# Patient Record
Sex: FEMALE | Race: WHITE | Employment: FULL TIME | URBAN - METROPOLITAN AREA
[De-identification: names, ages, dates, MRNs, and addresses within clinical notes are randomized per-mention and may not be internally consistent; named-entity substitution may affect disease eponyms.]

---

## 2021-09-10 NOTE — PROGRESS NOTES
SUBJECTIVE:   CC: Helena Pelaez is an 26 year old woman who presents for preventive health visit.       Patient has been advised of split billing requirements and indicates understanding:   Healthy Habits:     Getting at least 3 servings of Calcium per day:  NO    Bi-annual eye exam:  NO    Dental care twice a year:  Yes    Sleep apnea or symptoms of sleep apnea:  None    Diet:  Vegetarian/vegan    Frequency of exercise:  None    Taking medications regularly:  No    Medication side effects:  None    PHQ-2 Total Score: 0    Additional concerns today:  Yes          Discuss family history recently maternal aunt diagnosed with ovarian cancer.    Today's PHQ-2 Score:   PHQ-2 ( 1999 [employer]) 9/14/2021   Q1: Little interest or pleasure in doing things 0   Q2: Feeling down, depressed or hopeless 0   PHQ-2 Score 0   Q1: Little interest or pleasure in doing things Not at all   Q2: Feeling down, depressed or hopeless Not at all   PHQ-2 Score 0       Abuse: Current or Past (Physical, Sexual or Emotional) - No  Do you feel safe in your environment? Yes        Social History     Tobacco Use     Smoking status: Never Smoker     Smokeless tobacco: Never Used   Substance Use Topics     Alcohol use: Never     If you drink alcohol do you typically have >3 drinks per day or >7 drinks per week? No    Alcohol Use 9/14/2021   Prescreen: >3 drinks/day or >7 drinks/week? No   Prescreen: >3 drinks/day or >7 drinks/week? -   No flowsheet data found.    Reviewed orders with patient.  Reviewed health maintenance and updated orders accordingly - Yes  Patient Active Problem List   Diagnosis     Lumbago     Generalized anxiety disorder     Iron deficiency anemia due to chronic blood loss     History reviewed. No pertinent surgical history.    Social History     Tobacco Use     Smoking status: Never Smoker     Smokeless tobacco: Never Used   Substance Use Topics     Alcohol use: Never     Family History   Problem Relation Age of Onset      Ovarian cysts Mother      Multiple Sclerosis Mother 55     Depression Mother      Anxiety Disorder Mother      Coronary Artery Disease Maternal Grandmother      Diabetes Type 2  Paternal Grandmother      Hypertension Paternal Grandmother      Ovarian Cancer Maternal Aunt 65           Breast Cancer Screening:  Any new diagnosis of family breast, ovarian, or bowel cancer?     FHS-7:   Breast CA Risk Assessment (FHS-7) 9/14/2021   Did any of your first-degree relatives have breast or ovarian cancer? Yes   Did any of your relatives have bilateral breast cancer? No   Did any man in your family have breast cancer? No   Did any woman in your family have breast and ovarian cancer? Yes   Did any woman in your family have breast cancer before age 50 y? No   Do you have 2 or more relatives with breast and/or ovarian cancer? Yes   Do you have 2 or more relatives with breast and/or bowel cancer? No       Patient under 40 years of age: Routine Mammogram Screening not recommended.   Pertinent mammograms are reviewed under the imaging tab.    History of abnormal Pap smear: NO - age 21-29 PAP every 3 years recommended     Reviewed and updated as needed this visit by clinical staff  Tobacco  Allergies  Meds  Problems  Med Hx  Surg Hx  Fam Hx  Soc Hx          Reviewed and updated as needed this visit by Provider     Problems    Fam Hx             Review of Systems   Constitutional: Negative for chills and fever.   HENT: Positive for ear pain. Negative for congestion, hearing loss and sore throat.    Eyes: Negative for pain and visual disturbance.   Respiratory: Positive for cough. Negative for shortness of breath.    Cardiovascular: Negative for chest pain, palpitations and peripheral edema.   Gastrointestinal: Negative for abdominal pain, constipation, diarrhea, heartburn, hematochezia and nausea.   Breasts:  Negative for tenderness, breast mass and discharge.   Genitourinary: Negative for dysuria, frequency, genital sores,  "hematuria, pelvic pain, urgency, vaginal bleeding and vaginal discharge.   Musculoskeletal: Negative for arthralgias, joint swelling and myalgias.   Skin: Negative for rash.   Neurological: Positive for dizziness. Negative for weakness, headaches and paresthesias.   Psychiatric/Behavioral: Negative for mood changes. The patient is nervous/anxious.           OBJECTIVE:   /69   Pulse 71   Temp 97.3  F (36.3  C)   Resp 16   Ht 1.803 m (5' 11\")   Wt 69.2 kg (152 lb 8 oz)   LMP 08/14/2021   SpO2 100%   BMI 21.27 kg/m    Physical Exam  GENERAL: alert and no distress  EYES: Eyes grossly normal to inspection, PERRL and conjunctivae and sclerae normal  HENT: ear canals and TM's normal, nose and mouth without ulcers or lesions  NECK: no adenopathy, no asymmetry, masses, or scars and thyroid normal to palpation  RESP: lungs clear to auscultation - no rales, rhonchi or wheezes  BREAST: normal without masses, tenderness or nipple discharge and no palpable axillary masses or adenopathy  CV: regular rate and rhythm, normal S1 S2, no S3 or S4, no murmur, click or rub, no peripheral edema and peripheral pulses strong  ABDOMEN: soft, nontender, no hepatosplenomegaly, no masses and bowel sounds normal  MS: no gross musculoskeletal defects noted, no edema  SKIN: no suspicious lesions or rashes  NEURO: Normal strength and tone, mentation intact and speech normal  PSYCH: mentation appears normal, affect normal/bright    Diagnostic Test Results:  Labs reviewed in Epic  Results for orders placed or performed in visit on 09/14/21 (from the past 24 hour(s))   CBC with platelets   Result Value Ref Range    WBC Count 6.1 4.0 - 11.0 10e3/uL    RBC Count 4.21 3.80 - 5.20 10e6/uL    Hemoglobin 9.0 (L) 11.7 - 15.7 g/dL    Hematocrit 30.0 (L) 35.0 - 47.0 %    MCV 71 (L) 78 - 100 fL    MCH 21.4 (L) 26.5 - 33.0 pg    MCHC 30.0 (L) 31.5 - 36.5 g/dL    RDW 15.0 10.0 - 15.0 %    Platelet Count 238 150 - 450 10e3/uL   Hepatitis C Screen " "Reflex to HCV RNA Quant and Genotype   Result Value Ref Range    Hepatitis C Antibody Nonreactive Nonreactive    Narrative    Assay performance characteristics have not been established for newborns, infants, and children.       ASSESSMENT/PLAN:   (Z00.00) Routine general medical examination at a health care facility  (primary encounter diagnosis)  Comment: routine screening   Pap pending   Plan: Pap screen reflex to HPV if ASCUS - recommend         age 25 - 29, HIV Antigen Antibody Combo,         Hepatitis C Screen Reflex to HCV RNA Quant and         Genotype, Lipid panel reflex to direct LDL         Non-fasting             (F41.1) Generalized anxiety disorder  Comment:    Plan:  Worked with therapist - not on any medications     (D50.0) Iron deficiency anemia due to chronic blood loss  Comment: iron def anemia her entire life -   Not clear why?  She had hemoglobin as low as 3 in the remote past. No prior transfusions  Will check some labs and will refer to hematology for further evaluation and any recommendations ongoing.  Plan: CBC with platelets, Ferritin, Iron and iron         binding capacity, Tissue transglutaminase randy         IgA and IgG, Oncology/Hematology Adult Referral               Patient has been advised of split billing requirements and indicates understanding: Yes  COUNSELING:  Reviewed preventive health counseling, as reflected in patient instructions    Estimated body mass index is 21.27 kg/m  as calculated from the following:    Height as of this encounter: 1.803 m (5' 11\").    Weight as of this encounter: 69.2 kg (152 lb 8 oz).         She reports that she has never smoked. She has never used smokeless tobacco.      Counseling Resources:  ATP IV Guidelines  Pooled Cohorts Equation Calculator  Breast Cancer Risk Calculator  BRCA-Related Cancer Risk Assessment: FHS-7 Tool  FRAX Risk Assessment  ICSI Preventive Guidelines  Dietary Guidelines for Americans, 2010  USDA's MyPlate  ASA " Prophylaxis  Lung CA Screening    Ina Brady, [facility]

## 2021-09-14 ENCOUNTER — TELEPHONE (OUTPATIENT)
Dept: FAMILY MEDICINE | Facility: CLINIC | Age: 26
End: 2021-09-14

## 2021-09-14 ENCOUNTER — OFFICE VISIT (OUTPATIENT)
Dept: FAMILY MEDICINE | Facility: CLINIC | Age: 26
End: 2021-09-14
Payer: COMMERCIAL

## 2021-09-14 VITALS
RESPIRATION RATE: 16 BRPM | BODY MASS INDEX: 21.35 KG/M2 | HEART RATE: 71 BPM | OXYGEN SATURATION: 100 % | DIASTOLIC BLOOD PRESSURE: 69 MMHG | SYSTOLIC BLOOD PRESSURE: 123 MMHG | TEMPERATURE: 97.3 F | WEIGHT: 152.5 LBS | HEIGHT: 71 IN

## 2021-09-14 DIAGNOSIS — F41.1 GENERALIZED ANXIETY DISORDER: ICD-10-CM

## 2021-09-14 DIAGNOSIS — Z00.00 ROUTINE GENERAL MEDICAL EXAMINATION AT A HEALTH CARE FACILITY: Primary | ICD-10-CM

## 2021-09-14 DIAGNOSIS — D50.0 IRON DEFICIENCY ANEMIA DUE TO CHRONIC BLOOD LOSS: ICD-10-CM

## 2021-09-14 LAB
CHOLEST SERPL-MCNC: 145 MG/DL
ERYTHROCYTE [DISTWIDTH] IN BLOOD BY AUTOMATED COUNT: 15 % (ref 10–15)
FASTING STATUS PATIENT QL REPORTED: YES
FERRITIN SERPL-MCNC: 3 NG/ML (ref 12–150)
HCT VFR BLD AUTO: 30 % (ref 35–47)
HCV AB SERPL QL IA: NONREACTIVE
HDLC SERPL-MCNC: 59 MG/DL
HGB BLD-MCNC: 9 G/DL (ref 11.7–15.7)
IRON SATN MFR SERPL: 4 % (ref 15–46)
IRON SERPL-MCNC: 19 UG/DL (ref 35–180)
LDLC SERPL CALC-MCNC: 68 MG/DL
MCH RBC QN AUTO: 21.4 PG (ref 26.5–33)
MCHC RBC AUTO-ENTMCNC: 30 G/DL (ref 31.5–36.5)
MCV RBC AUTO: 71 FL (ref 78–100)
NONHDLC SERPL-MCNC: 86 MG/DL
PLATELET # BLD AUTO: 238 10E3/UL (ref 150–450)
RBC # BLD AUTO: 4.21 10E6/UL (ref 3.8–5.2)
TIBC SERPL-MCNC: 431 UG/DL (ref 240–430)
TRIGL SERPL-MCNC: 92 MG/DL
WBC # BLD AUTO: 6.1 10E3/UL (ref 4–11)

## 2021-09-14 PROCEDURE — 86803 HEPATITIS C AB TEST: CPT | Performed by: FAMILY MEDICINE

## 2021-09-14 PROCEDURE — 85027 COMPLETE CBC AUTOMATED: CPT | Performed by: FAMILY MEDICINE

## 2021-09-14 PROCEDURE — G0145 SCR C/V CYTO,THINLAYER,RESCR: HCPCS | Performed by: FAMILY MEDICINE

## 2021-09-14 PROCEDURE — 80061 LIPID PANEL: CPT | Performed by: FAMILY MEDICINE

## 2021-09-14 PROCEDURE — 82728 ASSAY OF FERRITIN: CPT | Performed by: FAMILY MEDICINE

## 2021-09-14 PROCEDURE — 90686 IIV4 VACC NO PRSV 0.5 ML IM: CPT | Performed by: FAMILY MEDICINE

## 2021-09-14 PROCEDURE — 99385 PREV VISIT NEW AGE 18-39: CPT | Mod: 25 | Performed by: FAMILY MEDICINE

## 2021-09-14 PROCEDURE — 83516 IMMUNOASSAY NONANTIBODY: CPT | Mod: 59 | Performed by: FAMILY MEDICINE

## 2021-09-14 PROCEDURE — 83550 IRON BINDING TEST: CPT | Performed by: FAMILY MEDICINE

## 2021-09-14 PROCEDURE — 87389 HIV-1 AG W/HIV-1&-2 AB AG IA: CPT | Performed by: FAMILY MEDICINE

## 2021-09-14 PROCEDURE — 90471 IMMUNIZATION ADMIN: CPT | Performed by: FAMILY MEDICINE

## 2021-09-14 PROCEDURE — 36415 COLL VENOUS BLD VENIPUNCTURE: CPT | Performed by: FAMILY MEDICINE

## 2021-09-14 ASSESSMENT — ENCOUNTER SYMPTOMS
NERVOUS/ANXIOUS: 1
JOINT SWELLING: 0
HEMATOCHEZIA: 0
FREQUENCY: 0
CONSTIPATION: 0
SHORTNESS OF BREATH: 0
SORE THROAT: 0
EYE PAIN: 0
PALPITATIONS: 0
DIARRHEA: 0
MYALGIAS: 0
WEAKNESS: 0
ARTHRALGIAS: 0
FEVER: 0
NAUSEA: 0
HEADACHES: 0
ABDOMINAL PAIN: 0
COUGH: 1
HEARTBURN: 0
HEMATURIA: 0
CHILLS: 0
PARESTHESIAS: 0
DYSURIA: 0
BREAST MASS: 0
DIZZINESS: 1

## 2021-09-14 ASSESSMENT — MIFFLIN-ST. JEOR: SCORE: 1522.87

## 2021-09-14 NOTE — RESULT ENCOUNTER NOTE
Please call to let her know:  Dear Helena,   Not all tests are back yet - but your hemoglobin is low (as always).  I placed and order for hematology and someone should call to set up a visit.  It would be good to find out why you have been anemic your entire life.  Let us know if you have any questions.  -Ina Brady, DO

## 2021-09-14 NOTE — TELEPHONE ENCOUNTER
Left message for patient to call [facility] back  When patient calls back please transfer to RN  Christine VASQUEZ, BETSY Brady, Ina PALMER,    9/14/2021  4:32 PM CDT Back to Top      Please call to let her know:  Dear Helena,   Not all tests are back yet - but your hemoglobin is low (as always).  I placed and order for hematology and someone should call to set up a visit.  It would be good to find out why you have been anemic your entire life.  Let us know if you have any questions.  -Ina Brady, DO

## 2021-09-14 NOTE — LETTER
September 16, 2021      Helena M Benigno  [address] [facility] [address]        Dear ,    We are writing to inform you of your test results.    Your hemoglobin is low like we discussed.  Iron store - ferritin and iron - are also low.   The TTG or test for celiac was negative at this time.   Other tests are all normal/negative.       Resulted Orders   CBC with platelets   Result Value Ref Range    WBC Count 6.1 4.0 - 11.0 10e3/uL    RBC Count 4.21 3.80 - 5.20 10e6/uL    Hemoglobin 9.0 (L) 11.7 - 15.7 g/dL    Hematocrit 30.0 (L) 35.0 - 47.0 %    MCV 71 (L) 78 - 100 fL    MCH 21.4 (L) 26.5 - 33.0 pg    MCHC 30.0 (L) 31.5 - 36.5 g/dL    RDW 15.0 10.0 - 15.0 %    Platelet Count 238 150 - 450 10e3/uL   Ferritin   Result Value Ref Range    Ferritin 3 (L) 12 - 150 ng/mL   Iron and iron binding capacity   Result Value Ref Range    Iron 19 (L) 35 - 180 ug/dL    Iron Binding Capacity 431 (H) 240 - 430 ug/dL    Iron Sat Index 4 (L) 15 - 46 %   Tissue transglutaminase randy IgA and IgG   Result Value Ref Range    Tissue Transglutaminase Antibody IgA 0.4 <7.0 U/mL      Comment:      Negative- The tTG-IgA assay has limited utility for patients with decreased levels of IgA. Screening for celiac disease should include IgA testing to rule out selective IgA deficiency and to guide selection and interpretation of serological testing. tTG-IgG testing may be positive in celiac disease patients with IgA deficiency.    Tissue Transglutaminase Antibody IgG 1.4 <7.0 U/mL      Comment:      Negative   HIV Antigen Antibody Combo   Result Value Ref Range    HIV Antigen Antibody Combo Nonreactive Nonreactive      Comment:      HIV-1 p24 Ag & HIV-1/HIV-2 Ab Not Detected   Hepatitis C Screen Reflex to HCV RNA Quant and Genotype   Result Value Ref Range    Hepatitis C Antibody Nonreactive Nonreactive    Narrative    Assay performance characteristics have not been established for newborns, infants, and children.   Lipid panel  reflex to direct LDL Non-fasting   Result Value Ref Range    Cholesterol 145 <200 mg/dL    Triglycerides 92 <150 mg/dL    Direct Measure HDL 59 >=50 mg/dL    LDL Cholesterol Calculated 68 <=100 mg/dL    Non HDL Cholesterol 86 <130 mg/dL    Patient Fasting > 8hrs? Yes     Narrative    Cholesterol  Desirable:  <200 mg/dL    Triglycerides  Normal:  Less than 150 mg/dL  Borderline High:  150-199 mg/dL  High:  200-499 mg/dL  Very High:  Greater than or equal to 500 mg/dL    Direct Measure HDL  Female:  Greater than or equal to 50 mg/dL   Male:  Greater than or equal to 40 mg/dL    LDL Cholesterol  Desirable:  <100mg/dL  Above Desirable:  100-129 mg/dL   Borderline High:  130-159 mg/dL   High:  160-189 mg/dL   Very High:  >= 190 mg/dL    Non HDL Cholesterol  Desirable:  130 mg/dL  Above Desirable:  130-159 mg/dL  Borderline High:  160-189 mg/dL  High:  190-219 mg/dL  Very High:  Greater than or equal to 220 mg/dL       If you have any questions or concerns, please call the clinic at the number listed above.       Sincerely,      Ina Brady DO/ms

## 2021-09-15 LAB
HIV 1+2 AB+HIV1 P24 AG SERPL QL IA: NONREACTIVE
TTG IGA SER-ACNC: 0.4 U/ML
TTG IGG SER-ACNC: 1.4 U/ML

## 2021-09-15 NOTE — RESULT ENCOUNTER NOTE
Please send copy of results with a letter:    Enclosed is a copy of your results. If you have any questions, please contact us at [phone].    Your hemoglobin is low like we discussed.  Iron store - ferritin and iron - are also low.  The TTG or test for celiac was negative at this time.  Other tests are all normal/negative.      Thanks,   Ina Brady, DO

## 2021-09-16 LAB
BKR LAB AP GYN ADEQUACY: NORMAL
BKR LAB AP GYN INTERPRETATION: NORMAL
BKR LAB AP HPV REFLEX: NORMAL
BKR LAB AP LMP: NORMAL
BKR LAB AP PREVIOUS ABNORMAL: NORMAL
PATH REPORT.COMMENTS IMP SPEC: NORMAL
PATH REPORT.RELEVANT HX SPEC: NORMAL

## 2021-09-23 NOTE — PROGRESS NOTES
RECORDS STATUS - ALL OTHER DIAGNOSIS      RECORDS RECEIVED FROM: [facility]   DATE RECEIVED: 10/12/2021   NOTES STATUS DETAILS   OFFICE NOTE from referring provider Complete Epic   Ina Brady DO   OFFICE NOTE from medical oncologist N/A    DISCHARGE SUMMARY from hospital N/A    DISCHARGE REPORT from the ER     OPERATIVE REPORT N/A    MEDICATION LIST Complete [facility]   CLINICAL TRIAL TREATMENTS TO DATE     LABS     PATHOLOGY REPORTS     ANYTHING RELATED TO DIAGNOSIS Complete Labs last updated on 9/14/2021   GENONOMIC TESTING     TYPE:     IMAGING (NEED IMAGES & REPORT)     CT SCANS     MRI     MAMMO     ULTRASOUND     PET

## 2021-10-12 ENCOUNTER — PRE VISIT (OUTPATIENT)
Dept: ONCOLOGY | Facility: CLINIC | Age: 26
End: 2021-10-12

## 2021-10-12 ENCOUNTER — VIRTUAL VISIT (OUTPATIENT)
Dept: ONCOLOGY | Facility: CLINIC | Age: 26
End: 2021-10-12
Attending: FAMILY MEDICINE
Payer: COMMERCIAL

## 2021-10-12 ENCOUNTER — LAB (OUTPATIENT)
Dept: LAB | Facility: CLINIC | Age: 26
End: 2021-10-12
Payer: COMMERCIAL

## 2021-10-12 DIAGNOSIS — D50.0 IRON DEFICIENCY ANEMIA DUE TO CHRONIC BLOOD LOSS: ICD-10-CM

## 2021-10-12 DIAGNOSIS — D50.0 IRON DEFICIENCY ANEMIA DUE TO CHRONIC BLOOD LOSS: Primary | ICD-10-CM

## 2021-10-12 PROCEDURE — 83550 IRON BINDING TEST: CPT

## 2021-10-12 PROCEDURE — 36415 COLL VENOUS BLD VENIPUNCTURE: CPT

## 2021-10-12 PROCEDURE — 82728 ASSAY OF FERRITIN: CPT

## 2021-10-12 PROCEDURE — 82607 VITAMIN B-12: CPT

## 2021-10-12 PROCEDURE — 85045 AUTOMATED RETICULOCYTE COUNT: CPT

## 2021-10-12 PROCEDURE — 82746 ASSAY OF FOLIC ACID SERUM: CPT

## 2021-10-12 PROCEDURE — 80053 COMPREHEN METABOLIC PANEL: CPT

## 2021-10-12 PROCEDURE — 99204 OFFICE O/P NEW MOD 45 MIN: CPT | Mod: 95 | Performed by: INTERNAL MEDICINE

## 2021-10-12 PROCEDURE — 85025 COMPLETE CBC W/AUTO DIFF WBC: CPT

## 2021-10-12 RX ORDER — ALBUTEROL SULFATE 90 UG/1
1-2 AEROSOL, METERED RESPIRATORY (INHALATION)
Status: CANCELLED
Start: 2021-10-12

## 2021-10-12 RX ORDER — NALOXONE HYDROCHLORIDE 0.4 MG/ML
0.2 INJECTION, SOLUTION INTRAMUSCULAR; INTRAVENOUS; SUBCUTANEOUS
Status: CANCELLED | OUTPATIENT
Start: 2021-10-12

## 2021-10-12 RX ORDER — METHYLPREDNISOLONE SODIUM SUCCINATE 125 MG/2ML
125 INJECTION, POWDER, LYOPHILIZED, FOR SOLUTION INTRAMUSCULAR; INTRAVENOUS
Status: CANCELLED
Start: 2021-10-12

## 2021-10-12 RX ORDER — ALBUTEROL SULFATE 0.83 MG/ML
2.5 SOLUTION RESPIRATORY (INHALATION)
Status: CANCELLED | OUTPATIENT
Start: 2021-10-12

## 2021-10-12 RX ORDER — DIPHENHYDRAMINE HYDROCHLORIDE 50 MG/ML
50 INJECTION INTRAMUSCULAR; INTRAVENOUS
Status: CANCELLED
Start: 2021-10-12

## 2021-10-12 RX ORDER — MEPERIDINE HYDROCHLORIDE 25 MG/ML
25 INJECTION INTRAMUSCULAR; INTRAVENOUS; SUBCUTANEOUS EVERY 30 MIN PRN
Status: CANCELLED | OUTPATIENT
Start: 2021-10-12

## 2021-10-12 RX ORDER — HEPARIN SODIUM,PORCINE 10 UNIT/ML
5 VIAL (ML) INTRAVENOUS
Status: CANCELLED | OUTPATIENT
Start: 2021-10-12

## 2021-10-12 RX ORDER — HEPARIN SODIUM (PORCINE) LOCK FLUSH IV SOLN 100 UNIT/ML 100 UNIT/ML
5 SOLUTION INTRAVENOUS
Status: CANCELLED | OUTPATIENT
Start: 2021-10-12

## 2021-10-12 RX ORDER — EPINEPHRINE 1 MG/ML
0.3 INJECTION, SOLUTION INTRAMUSCULAR; SUBCUTANEOUS EVERY 5 MIN PRN
Status: CANCELLED | OUTPATIENT
Start: 2021-10-12

## 2021-10-12 NOTE — PATIENT INSTRUCTIONS
Schedule labs and in.  In the next few days.    6 weeks after that a repeat labs and see me    Follow with GYN.

## 2021-10-12 NOTE — LETTER
10/12/2021         RE: Helena Pelaez  [address] [facility] [address]        Dear Colleague,    Thank you for referring your patient, Helena Pelaez, to the [facility]. Please see a copy of my visit note below.    Helena is a 26 year old who is being evaluated via a billable video visit.      Helena Pelaez stated she is currently in the state of [place] for her visit today.    How would you like to obtain your AVS? MyChart  If the video visit is dropped, the invitation should be resent by: Text to cell phone: [phone]  Will anyone else be joining your video visit? No    Video Start Time: 10:40 AM  Video-Visit Details    Type of service:  Video Visit    Video End Time:11:05 AM    Originating Location (pt. Location): Home    Distant Location (provider location):  [facility]     Platform used for Video Visit: Wilfredo Hsieh, Virtual Visit Facilitator      Hematology initial visit:  Date on this visit: 10/12/2021    Helena Pelaez  is referred by Dr.Pamela SPENCER Brady for a hematology consultation. She requires evaluation for anemia.    Primary Physician: Carolina Cox     History Of Present Illness:  Ms. Pelaez is a 26 year old female who presents with anemia.    This is a video visit  She has a history of iron deficiency anemia in the past.    She has been anemic since the age of 18 months. When she was in 8th grade, she had Hg of 3. No hx of blood transfusion or IV iron    During High School, she had EGD, colonoscopy and Pill Cam. These tests showed that she is bleeding from the gut but unable to pin point from where she is bleeding.    For the last several months, she has been feeling fatigue, dizziness/light headedness, mental fogginess. She has cravings for green olives/oreos. Has rare restless legs. Has not noticed hematochezia/melena. No nausea, vomiting, diarrhea or constipation. No  hematuria. Has heavy menstrual bleeding and bleeds once a months. She bleeds on average 5-6 days a months. 3 days are heavy. She uses super plus tampons and also uses pads overnight. Changes tampons every 3 hours and these are soaked with blood and she routinely sees clots. Has tried oral iron for several years and tolerates them well but see no improvement clinically.     ROS:  A comprehensive ROS was otherwise neg      Past Medical/Surgical History:  No past medical history on file.  No past surgical history on file.   Iron deficiency anemia.      Allergies:  Allergies as of 10/12/2021 - Reviewed 10/12/2021   Allergen Reaction Noted     Amoxicillin Hives 11/21/2013     Current Medications:  Current Outpatient Medications   Medication Sig Dispense Refill     IRON PO Take 1 tablet by mouth daily        Family History:  Family History   Problem Relation Age of Onset     Ovarian cysts Mother      Multiple Sclerosis Mother 55     Depression Mother      Anxiety Disorder Mother      Coronary Artery Disease Maternal Grandmother      Diabetes Type 2  Paternal Grandmother      Hypertension Paternal Grandmother      Ovarian Cancer Maternal Aunt 65     Mother has iron deficiency anemia- she does not know the cause.    Social History:  Social History     Socioeconomic History     Marital status:      Spouse name: Not on file     Number of children: Not on file     Years of education: Not on file     Highest education level: Not on file   Occupational History     Not on file   Tobacco Use     Smoking status: Never Smoker     Smokeless tobacco: Never Used   Substance and Sexual Activity     Alcohol use: Never     Drug use: Never     Sexual activity: Yes     Partners: Male   Other Topics Concern     Not on file   Social History Narrative     Not on file     Social Determinants of Health     Financial Resource Strain:      Difficulty of Paying Living Expenses:    Food Insecurity:      Worried About Running Out of Food in  the Last Year:      Ran Out of Food in the Last Year:    Transportation Needs:      Lack of Transportation (Medical):      Lack of Transportation (Non-Medical):    Physical Activity:      Days of Exercise per Week:      Minutes of Exercise per Session:    Stress:      Feeling of Stress :    Social Connections:      Frequency of Communication with Friends and Family:      Frequency of Social Gatherings with Friends and Family:      Attends [employer] Services:      Active Member of Clubs or Organizations:      Attends Club or Organization Meetings:      Marital Status:    Intimate Partner Violence:      Fear of Current or Ex-Partner:      Emotionally Abused:      Physically Abused:      Sexually Abused:      Physical Exam:  There were no vitals taken for this visit.   Wt Readings from Last 4 Encounters:   09/14/21 69.2 kg (152 lb 8 oz)   11/21/13 70.3 kg (155 lb) (84 %, Z= 1.01)*     * Growth percentiles are based on CDC (Girls, 2-20 Years) data.       Constitutional.  Does not seem to be in any acute distress.  Eyes.  No redness or discharge noted.  Respiratory.  Speaking in full sentences.  Breathing seems comfortable without any accessory use of muscles.    Skin.  Visualized his skin does not show any obvious rashes.  Musculoskeletal.  Range of motion for visualized areas is intact.  Neurological.  Alert and oriented x3.  Psychiatric.  Mood, mentation and affect are normal.  Decision making capacity is intact.      The rest of a comprehensive physical examination is deferred due to Public Health Emergency video visit restrictions.  Laboratory/Imaging Studies      Reviewed  9/24/2021  CBC shows WBC 6.1. Hemoglobin 9. MCV is 71. Platelets 238.  Ferritin 3, iron 19, TIBC 431, iron saturation index 4%.  Tissue transglutaminase antibodies negative.    Hep C and HIV - negative      ASSESSMENT/PLAN:    Iron deficiency anemia.  At least at this time heavy menstrual bleeding is contributing.  It seemed that previously she  had issues with GI bleeding.  Right now she does not see any hematochezia or melena.  I would recommend checking a peripheral blood smear and check baseline labs again and then give her INFeD because of significant symptomatic iron deficiency anemia and inability of oral iron to correct the problem.  We discussed the rational schedule and potential side effects of INFeD in detail.  We will try to arrange that in the next few days.  About 6 weeks after that we will repeat labs to see what improvement we have made.    Managing the cause of iron deficiency anemia.  I recommend follow-up with GYN to see what options she has to control the heavy menstrual bleeding.    We discussed that if despite controlling heavy menstrual bleeding, she still continues to become iron deficient then she should have a proper reevaluation by gastroenterologist to see if there is any bleeding from the gut.  For now we will hold off on that.    I will see her back in about a couple of months with repeat labs prior.    All questions were answered to her satisfaction.  She is agreeable and comfortable with the plan.    Al Trinidad MD     Video start time. 10:40 AM  Video stop time. 11:05 AM        Again, thank you for allowing me to participate in the care of your patient.        Sincerely,        Al Trinidad MD

## 2021-10-12 NOTE — PROGRESS NOTES
Helena is a 26 year old who is being evaluated via a billable video visit.      Helena Pelaez stated she is currently in the state [facility] for her visit today.    How would you like to obtain your AVS? MyChart  If the video visit is dropped, the invitation should be resent by: Text to cell phone: [phone]  Will anyone else be joining your video visit? No    Video Start Time: 10:40 AM  Video-Visit Details    Type of service:  Video Visit    Video End Time:11:05 AM    Originating Location (pt. Location): Home    Distant Location (provider location):  [facility]     Platform used for Video Visit: Wilfredo Hsieh, Virtual Visit Facilitator

## 2021-10-12 NOTE — PROGRESS NOTES
Hematology initial visit:  Date on this visit: 10/12/2021    Helena Pelaez  is referred by Dr.Pamela SPENCER Brady for a hematology consultation. She requires evaluation for anemia.    Primary Physician: Carolina Cox     History Of Present Illness:  Ms. Pelaez is a 26 year old female who presents with anemia.    This is a video visit  She has a history of iron deficiency anemia in the past.    She has been anemic since the age of 18 months. When she was in 8th grade, she had Hg of 3. No hx of blood transfusion or IV iron    During High School, she had EGD, colonoscopy and Pill Cam. These tests showed that she is bleeding from the gut but unable to pin point from where she is bleeding.    For the last several months, she has been feeling fatigue, dizziness/light headedness, mental fogginess. She has cravings for green olives/oreos. Has rare restless legs. Has not noticed hematochezia/melena. No nausea, vomiting, diarrhea or constipation. No hematuria. Has heavy menstrual bleeding and bleeds once a months. She bleeds on average 5-6 days a months. 3 days are heavy. She uses super plus tampons and also uses pads overnight. Changes tampons every 3 hours and these are soaked with blood and she routinely sees clots. Has tried oral iron for several years and tolerates them well but see no improvement clinically.     ROS:  A comprehensive ROS was otherwise neg      Past Medical/Surgical History:  No past medical history on file.  No past surgical history on file.   Iron deficiency anemia.      Allergies:  Allergies as of 10/12/2021 - Reviewed 10/12/2021   Allergen Reaction Noted     Amoxicillin Hives 11/21/2013     Current Medications:  Current Outpatient Medications   Medication Sig Dispense Refill     IRON PO Take 1 tablet by mouth daily        Family History:  Family History   Problem Relation Age of Onset     Ovarian cysts Mother      Multiple Sclerosis Mother 55     Depression Mother      Anxiety  Disorder Mother      Coronary Artery Disease Maternal Grandmother      Diabetes Type 2  Paternal Grandmother      Hypertension Paternal Grandmother      Ovarian Cancer Maternal Aunt 65     Mother has iron deficiency anemia- she does not know the cause.    Social History:  Social History     Socioeconomic History     Marital status:      Spouse name: Not on file     Number of children: Not on file     Years of education: Not on file     Highest education level: Not on file   Occupational History     Not on file   Tobacco Use     Smoking status: Never Smoker     Smokeless tobacco: Never Used   Substance and Sexual Activity     Alcohol use: Never     Drug use: Never     Sexual activity: Yes     Partners: Male   Other Topics Concern     Not on file   Social History Narrative     Not on file     Social Determinants of Health     Financial Resource Strain:      Difficulty of Paying Living Expenses:    Food Insecurity:      Worried About Running Out of Food in the Last Year:      Ran Out of Food in the Last Year:    Transportation Needs:      Lack of Transportation (Medical):      Lack of Transportation (Non-Medical):    Physical Activity:      Days of Exercise per Week:      Minutes of Exercise per Session:    Stress:      Feeling of Stress :    Social Connections:      Frequency of Communication with Friends and Family:      Frequency of Social Gatherings with Friends and Family:      Attends [employer] Services:      Active Member of Clubs or Organizations:      Attends Club or Organization Meetings:      Marital Status:    Intimate Partner Violence:      Fear of Current or Ex-Partner:      Emotionally Abused:      Physically Abused:      Sexually Abused:      Physical Exam:  There were no vitals taken for this visit.   Wt Readings from Last 4 Encounters:   09/14/21 69.2 kg (152 lb 8 oz)   11/21/13 70.3 kg (155 lb) (84 %, Z= 1.01)*     * Growth percentiles are based on [facility] (Girls, 2-20 Years) data.        Constitutional.  Does not seem to be in any acute distress.  Eyes.  No redness or discharge noted.  Respiratory.  Speaking in full sentences.  Breathing seems comfortable without any accessory use of muscles.    Skin.  Visualized his skin does not show any obvious rashes.  Musculoskeletal.  Range of motion for visualized areas is intact.  Neurological.  Alert and oriented x3.  Psychiatric.  Mood, mentation and affect are normal.  Decision making capacity is intact.      The rest of a comprehensive physical examination is deferred due to Public Health Emergency video visit restrictions.  Laboratory/Imaging Studies      Reviewed  9/24/2021  CBC shows WBC 6.1. Hemoglobin 9. MCV is 71. Platelets 238.  Ferritin 3, iron 19, TIBC 431, iron saturation index 4%.  Tissue transglutaminase antibodies negative.    Hep C and HIV - negative      ASSESSMENT/PLAN:    Iron deficiency anemia.  At least at this time heavy menstrual bleeding is contributing.  It seemed that previously she had issues with GI bleeding.  Right now she does not see any hematochezia or melena.  I would recommend checking a peripheral blood smear and check baseline labs again and then give her INFeD because of significant symptomatic iron deficiency anemia and inability of oral iron to correct the problem.  We discussed the rational schedule and potential side effects of INFeD in detail.  We will try to arrange that in the next few days.  About 6 weeks after that we will repeat labs to see what improvement we have made.    Managing the cause of iron deficiency anemia.  I recommend follow-up with GYN to see what options she has to control the heavy menstrual bleeding.    We discussed that if despite controlling heavy menstrual bleeding, she still continues to become iron deficient then she should have a proper reevaluation by gastroenterologist to see if there is any bleeding from the gut.  For now we will hold off on that.    I will see her back in  about a couple of months with repeat labs prior.    All questions were answered to her satisfaction.  She is agreeable and comfortable with the plan.    Al Trinidad MD     Video start time. 10:40 AM  Video stop time. 11:05 AM

## 2021-10-12 NOTE — NURSING NOTE
Helena Pelaez 26 year old female presents today to discuss hemoglobin levels of 9, has had chronic blood loss since she was 18 months old, had pill cam at [facility] that showed she was bleeding internally but could not determine from site. Discuss options such as injections/infusions for anemia.    Stephanie Hsieh, Virtual Facilitator

## 2021-10-13 ENCOUNTER — PATIENT OUTREACH (OUTPATIENT)
Dept: CARE COORDINATION | Facility: CLINIC | Age: 26
End: 2021-10-13

## 2021-10-13 LAB
ALBUMIN SERPL-MCNC: 4 G/DL (ref 3.4–5)
ALP SERPL-CCNC: 59 U/L (ref 40–150)
ALT SERPL W P-5'-P-CCNC: 15 U/L (ref 0–50)
ANION GAP SERPL CALCULATED.3IONS-SCNC: 7 MMOL/L (ref 3–14)
AST SERPL W P-5'-P-CCNC: 14 U/L (ref 0–45)
BASOPHILS # BLD AUTO: 0.1 10E3/UL (ref 0–0.2)
BASOPHILS NFR BLD AUTO: 1 %
BILIRUB SERPL-MCNC: 0.2 MG/DL (ref 0.2–1.3)
BUN SERPL-MCNC: 9 MG/DL (ref 7–30)
CALCIUM SERPL-MCNC: 9.1 MG/DL (ref 8.5–10.1)
CHLORIDE BLD-SCNC: 104 MMOL/L (ref 94–109)
CO2 SERPL-SCNC: 24 MMOL/L (ref 20–32)
CREAT SERPL-MCNC: 0.8 MG/DL (ref 0.52–1.04)
EOSINOPHIL # BLD AUTO: 0.2 10E3/UL (ref 0–0.7)
EOSINOPHIL NFR BLD AUTO: 3 %
ERYTHROCYTE [DISTWIDTH] IN BLOOD BY AUTOMATED COUNT: 15.6 % (ref 10–15)
FERRITIN SERPL-MCNC: 2 NG/ML (ref 12–150)
FOLATE SERPL-MCNC: 15.6 NG/ML
GFR SERPL CREATININE-BSD FRML MDRD: >90 ML/MIN/1.73M2
GLUCOSE BLD-MCNC: 135 MG/DL (ref 70–99)
HCT VFR BLD AUTO: 27.7 % (ref 35–47)
HGB BLD-MCNC: 8 G/DL (ref 11.7–15.7)
IMM GRANULOCYTES # BLD: 0 10E3/UL
IMM GRANULOCYTES NFR BLD: 0 %
IRON SATN MFR SERPL: 3 % (ref 15–46)
IRON SERPL-MCNC: 12 UG/DL (ref 35–180)
LYMPHOCYTES # BLD AUTO: 1.5 10E3/UL (ref 0.8–5.3)
LYMPHOCYTES NFR BLD AUTO: 31 %
MCH RBC QN AUTO: 20.8 PG (ref 26.5–33)
MCHC RBC AUTO-ENTMCNC: 28.9 G/DL (ref 31.5–36.5)
MCV RBC AUTO: 72 FL (ref 78–100)
MONOCYTES # BLD AUTO: 0.3 10E3/UL (ref 0–1.3)
MONOCYTES NFR BLD AUTO: 6 %
NEUTROPHILS # BLD AUTO: 2.8 10E3/UL (ref 1.6–8.3)
NEUTROPHILS NFR BLD AUTO: 59 %
NRBC # BLD AUTO: 0 10E3/UL
NRBC BLD AUTO-RTO: 0 /100
PLATELET # BLD AUTO: 177 10E3/UL (ref 150–450)
POTASSIUM BLD-SCNC: 4 MMOL/L (ref 3.4–5.3)
PROT SERPL-MCNC: 7.9 G/DL (ref 6.8–8.8)
RBC # BLD AUTO: 3.85 10E6/UL (ref 3.8–5.2)
RETICS # AUTO: 0.04 10E6/UL (ref 0.03–0.1)
RETICS/RBC NFR AUTO: 1.2 % (ref 0.5–2)
SODIUM SERPL-SCNC: 135 MMOL/L (ref 133–144)
TIBC SERPL-MCNC: 427 UG/DL (ref 240–430)
VIT B12 SERPL-MCNC: 461 PG/ML (ref 193–986)
WBC # BLD AUTO: 4.8 10E3/UL (ref 4–11)

## 2021-10-13 NOTE — PROGRESS NOTES
Oncology Distress Screening Follow-up  Clinical Social Work  [facility]    Identified Concern and Score From Distress Screenin. How concerned are you about your ability to eat?   6Abnormal            2. How concerned are you about unintended weight loss or your current weight?   2           3. How concerned are you about feeling depressed or very sad?   4           4. How concerned are you about feeling anxious or very scared?   7Abnormal            5. Do you struggle with the loss of meaning and josafat in your life?   Not at all           6. How concerned are you about work and home life issues that may be affected by your cancer?   2           7. How concerned are you about knowing what resources are available to help you?   8Abnormal            8. Do you currently have what you would describe as Jehovah's witness or spiritual struggles?              Not at all                   Date of Distress Screening: 10/12/21      Data: Helena is a 26 year old female, seen by Dr. Trinidad for iron deficiency anemia to to chronic blood loss.       Intervention/Education Provided:: MAINE called and left message for Helena, introducing self and reason for call. MAINE provided contact information and encouraged Helena to call back when she is able.       Follow-up Required: SW will await Helena's call back.         DAMON Lutz, [facility]  Clinical , Adult Oncology  Phone: [phone]

## 2021-10-14 LAB
PATH REPORT.COMMENTS IMP SPEC: NORMAL
PATH REPORT.COMMENTS IMP SPEC: NORMAL
PATH REPORT.FINAL DX SPEC: NORMAL
PATH REPORT.MICROSCOPIC SPEC OTHER STN: NORMAL
PATH REPORT.MICROSCOPIC SPEC OTHER STN: NORMAL
PATH REPORT.RELEVANT HX SPEC: NORMAL

## 2021-10-19 ENCOUNTER — INFUSION THERAPY VISIT (OUTPATIENT)
Dept: INFUSION THERAPY | Facility: CLINIC | Age: 26
End: 2021-10-19
Attending: INTERNAL MEDICINE
Payer: COMMERCIAL

## 2021-10-19 VITALS
TEMPERATURE: 98.2 F | HEART RATE: 72 BPM | SYSTOLIC BLOOD PRESSURE: 105 MMHG | RESPIRATION RATE: 18 BRPM | OXYGEN SATURATION: 97 % | DIASTOLIC BLOOD PRESSURE: 68 MMHG

## 2021-10-19 DIAGNOSIS — D50.0 IRON DEFICIENCY ANEMIA DUE TO CHRONIC BLOOD LOSS: Primary | ICD-10-CM

## 2021-10-19 PROCEDURE — 258N000003 HC RX IP 258 OP 636: Performed by: INTERNAL MEDICINE

## 2021-10-19 PROCEDURE — 96365 THER/PROPH/DIAG IV INF INIT: CPT

## 2021-10-19 PROCEDURE — 96366 THER/PROPH/DIAG IV INF ADDON: CPT

## 2021-10-19 PROCEDURE — 250N000011 HC RX IP 250 OP 636: Performed by: INTERNAL MEDICINE

## 2021-10-19 PROCEDURE — 96376 TX/PRO/DX INJ SAME DRUG ADON: CPT

## 2021-10-19 RX ORDER — ALBUTEROL SULFATE 90 UG/1
1-2 AEROSOL, METERED RESPIRATORY (INHALATION)
Status: CANCELLED
Start: 2021-10-19

## 2021-10-19 RX ORDER — HEPARIN SODIUM,PORCINE 10 UNIT/ML
5 VIAL (ML) INTRAVENOUS
Status: CANCELLED | OUTPATIENT
Start: 2021-10-19

## 2021-10-19 RX ORDER — MEPERIDINE HYDROCHLORIDE 25 MG/ML
25 INJECTION INTRAMUSCULAR; INTRAVENOUS; SUBCUTANEOUS EVERY 30 MIN PRN
Status: CANCELLED | OUTPATIENT
Start: 2021-10-19

## 2021-10-19 RX ORDER — HEPARIN SODIUM (PORCINE) LOCK FLUSH IV SOLN 100 UNIT/ML 100 UNIT/ML
5 SOLUTION INTRAVENOUS
Status: CANCELLED | OUTPATIENT
Start: 2021-10-19

## 2021-10-19 RX ORDER — METHYLPREDNISOLONE SODIUM SUCCINATE 125 MG/2ML
125 INJECTION, POWDER, LYOPHILIZED, FOR SOLUTION INTRAMUSCULAR; INTRAVENOUS
Status: CANCELLED
Start: 2021-10-19

## 2021-10-19 RX ORDER — ALBUTEROL SULFATE 0.83 MG/ML
2.5 SOLUTION RESPIRATORY (INHALATION)
Status: CANCELLED | OUTPATIENT
Start: 2021-10-19

## 2021-10-19 RX ORDER — DIPHENHYDRAMINE HYDROCHLORIDE 50 MG/ML
50 INJECTION INTRAMUSCULAR; INTRAVENOUS
Status: CANCELLED
Start: 2021-10-19

## 2021-10-19 RX ORDER — EPINEPHRINE 1 MG/ML
0.3 INJECTION, SOLUTION INTRAMUSCULAR; SUBCUTANEOUS EVERY 5 MIN PRN
Status: CANCELLED | OUTPATIENT
Start: 2021-10-19

## 2021-10-19 RX ORDER — NALOXONE HYDROCHLORIDE 0.4 MG/ML
0.2 INJECTION, SOLUTION INTRAMUSCULAR; INTRAVENOUS; SUBCUTANEOUS
Status: CANCELLED | OUTPATIENT
Start: 2021-10-19

## 2021-10-19 RX ADMIN — SODIUM CHLORIDE 975 MG: 9 INJECTION, SOLUTION INTRAVENOUS at 09:56

## 2021-10-19 RX ADMIN — SODIUM CHLORIDE 25 MG: 9 INJECTION, SOLUTION INTRAVENOUS at 08:36

## 2021-10-19 RX ADMIN — SODIUM CHLORIDE 250 ML: 9 INJECTION, SOLUTION INTRAVENOUS at 08:35

## 2021-10-19 NOTE — PROGRESS NOTES
Infusion Nursing Note:  Helena Pelaez presents today for Infed Infusion.    Patient seen by provider today: No   present during visit today: Not Applicable.    Note: Pt tolerated loading dose and infusion well. No SOB, numbness or tingling, dyspnea.       Intravenous Access:  Labs drawn without difficulty.    Treatment Conditions:  Lab Results   Component Value Date    HGB 8.0 (L) 10/12/2021    WBC 4.8 10/12/2021    ANEUTAUTO 2.8 10/12/2021     10/12/2021      Lab Results   Component Value Date     10/12/2021    POTASSIUM 4.0 10/12/2021    CR 0.80 10/12/2021    EARNEST 9.1 10/12/2021    BILITOTAL 0.2 10/12/2021    ALBUMIN 4.0 10/12/2021    ALT 15 10/12/2021    AST 14 10/12/2021     Results reviewed, labs MET treatment parameters, ok to proceed with treatment.      Post Infusion Assessment:  Patient tolerated infusion without incident.  Blood return noted pre and post infusion.  Site patent and intact, free from redness, edema or discomfort.  No evidence of extravasations.  Access discontinued per protocol.       Discharge Plan:   Patient declined prescription refills.  Discharge instructions reviewed with: Patient.  Patient and/or family verbalized understanding of discharge instructions and all questions answered.  AVS to patient via [employer].  Patient will return 11/30 for next appointment.   Patient discharged in stable condition accompanied by: self.  Departure Mode: Ambulatory.      Tejas Cadet RN

## 2021-11-09 ENCOUNTER — TELEPHONE (OUTPATIENT)
Dept: ONCOLOGY | Facility: CLINIC | Age: 26
End: 2021-11-09
Payer: COMMERCIAL

## 2021-11-09 NOTE — TELEPHONE ENCOUNTER
Patient called requesting that lab orders be placed for the patient to complete prior to her virtual visit with Dr. Trinidad on 11/30/2021.    Please call the patient once the labs have been ordered, she can be reached at home.    Thank you.    [facility]  Cancer/Infusion Center   [phone]

## 2021-11-10 DIAGNOSIS — D50.0 IRON DEFICIENCY ANEMIA DUE TO CHRONIC BLOOD LOSS: Primary | ICD-10-CM

## 2021-11-23 ENCOUNTER — LAB (OUTPATIENT)
Dept: LAB | Facility: CLINIC | Age: 26
End: 2021-11-23
Payer: COMMERCIAL

## 2021-11-23 DIAGNOSIS — D50.0 IRON DEFICIENCY ANEMIA DUE TO CHRONIC BLOOD LOSS: ICD-10-CM

## 2021-11-23 LAB
BASOPHILS # BLD AUTO: 0 10E3/UL (ref 0–0.2)
BASOPHILS NFR BLD AUTO: 1 %
EOSINOPHIL # BLD AUTO: 0.1 10E3/UL (ref 0–0.7)
EOSINOPHIL NFR BLD AUTO: 3 %
ERYTHROCYTE [DISTWIDTH] IN BLOOD BY AUTOMATED COUNT: 20.9 % (ref 10–15)
HCT VFR BLD AUTO: 33.5 % (ref 35–47)
HGB BLD-MCNC: 10.6 G/DL (ref 11.7–15.7)
LYMPHOCYTES # BLD AUTO: 1.2 10E3/UL (ref 0.8–5.3)
LYMPHOCYTES NFR BLD AUTO: 26 %
MCH RBC QN AUTO: 24.3 PG (ref 26.5–33)
MCHC RBC AUTO-ENTMCNC: 31.6 G/DL (ref 31.5–36.5)
MCV RBC AUTO: 77 FL (ref 78–100)
MONOCYTES # BLD AUTO: 0.3 10E3/UL (ref 0–1.3)
MONOCYTES NFR BLD AUTO: 7 %
NEUTROPHILS # BLD AUTO: 2.9 10E3/UL (ref 1.6–8.3)
NEUTROPHILS NFR BLD AUTO: 64 %
PLATELET # BLD AUTO: 172 10E3/UL (ref 150–450)
RBC # BLD AUTO: 4.36 10E6/UL (ref 3.8–5.2)
RETICS # AUTO: 0.04 10E6/UL (ref 0.03–0.1)
RETICS/RBC NFR AUTO: 0.9 % (ref 0.5–2)
WBC # BLD AUTO: 4.6 10E3/UL (ref 4–11)

## 2021-11-23 PROCEDURE — 36415 COLL VENOUS BLD VENIPUNCTURE: CPT

## 2021-11-23 PROCEDURE — 85025 COMPLETE CBC W/AUTO DIFF WBC: CPT

## 2021-11-23 PROCEDURE — 83550 IRON BINDING TEST: CPT

## 2021-11-23 PROCEDURE — 85045 AUTOMATED RETICULOCYTE COUNT: CPT

## 2021-11-23 PROCEDURE — 82728 ASSAY OF FERRITIN: CPT

## 2021-11-24 LAB
FERRITIN SERPL-MCNC: 112 NG/ML (ref 12–150)
IRON SATN MFR SERPL: 23 % (ref 15–46)
IRON SERPL-MCNC: 60 UG/DL (ref 35–180)
TIBC SERPL-MCNC: 263 UG/DL (ref 240–430)

## 2021-11-30 ENCOUNTER — VIRTUAL VISIT (OUTPATIENT)
Dept: ONCOLOGY | Facility: CLINIC | Age: 26
End: 2021-11-30
Payer: COMMERCIAL

## 2021-11-30 DIAGNOSIS — D50.0 IRON DEFICIENCY ANEMIA DUE TO CHRONIC BLOOD LOSS: Primary | ICD-10-CM

## 2021-11-30 PROCEDURE — 99214 OFFICE O/P EST MOD 30 MIN: CPT | Mod: 95 | Performed by: INTERNAL MEDICINE

## 2021-11-30 NOTE — PROGRESS NOTES
Helena is a 26 year old who is being evaluated via a billable video visit. Currently in State of [place].     How would you like to obtain your AVS? [employer]  If the video visit is dropped, the invitation should be resent by: Text to cell phone: [phone]  Will anyone else be joining your video visit? No    Video Start Time: 9:08 AM  Video-Visit Details    Type of service:  Video Visit    Video End Time:9:17 AM    Originating Location (pt. Location): Home    Distant Location (provider location):  [facility]     Platform used for Video Visit: [employer] Mobile

## 2021-11-30 NOTE — PROGRESS NOTES
Hematology follow-up visit:  Date on this visit: 11/30/21     Helena Pelaez  is referred by Dr.Pamela SPENCER Brady for a hematology consultation. She requires evaluation for anemia.    Primary Physician: Carolina Cox     History Of Present Illness:  Ms. Pelaez is a 26 year old female who presents with anemia.    She initially saw me on 10/12/2021.  Please see my previous note for details.  I have copied and updated from prior note.    She has a history of iron deficiency anemia in the past.    She has been anemic since the age of 18 months. When she was in 8th grade, she had Hg of 3. No hx of blood transfusion or IV iron    During High School, she had EGD, colonoscopy and Pill Cam. These tests showed that she is bleeding from the gut but unable to pin point from where she is bleeding.    For the last several months, she has been feeling fatigue, dizziness/light headedness, mental fogginess. She has cravings for green olives/oreos. Has rare restless legs. Has not noticed hematochezia/melena. No nausea, vomiting, diarrhea or constipation. No hematuria. Has heavy menstrual bleeding and bleeds once a months. She bleeds on average 5-6 days a months. 3 days are heavy. She uses super plus tampons and also uses pads overnight. Changes tampons every 3 hours and these are soaked with blood and she routinely sees clots. Has tried oral iron for several years and tolerates them well but see no improvement clinically.     Interval history.  This is a video visit.  She received INFeD on 10/19/2021.  She feels better and the lightheaded/dizzy spells have not happened again.  Energy is also better although feels a little fatigued.  Cravings for all of/overuse has also improved.  Mental fogginess is better although has not completely resolved.  She continues to have heavy menstrual bleeding although she thinks that the last menstrual period was slightly less heavy.  No other bleeding anywhere else.  She has not  talked to GYN.  She stopped taking oral iron after she received IV iron.  She was tolerating oral iron once daily well.    ROS:  Rest of the review of the system was unremarkable.    I reviewed other history in epic as below.      Past Medical/Surgical History:  No past medical history on file.  No past surgical history on file.   Iron deficiency anemia.      Allergies:  Allergies as of 11/30/2021 - Reviewed 11/30/2021   Allergen Reaction Noted     Amoxicillin Hives 11/21/2013     Current Medications:  Current Outpatient Medications   Medication Sig Dispense Refill     IRON PO Take 1 tablet by mouth daily        Family History:  Family History   Problem Relation Age of Onset     Ovarian cysts Mother      Multiple Sclerosis Mother 55     Depression Mother      Anxiety Disorder Mother      Coronary Artery Disease Maternal Grandmother      Diabetes Type 2  Paternal Grandmother      Hypertension Paternal Grandmother      Ovarian Cancer Maternal Aunt 65     Mother has iron deficiency anemia- she does not know the cause.    Social History:  Social History     Socioeconomic History     Marital status:      Spouse name: Not on file     Number of children: Not on file     Years of education: Not on file     Highest education level: Not on file   Occupational History     Not on file   Tobacco Use     Smoking status: Never Smoker     Smokeless tobacco: Never Used   Substance and Sexual Activity     Alcohol use: Never     Drug use: Never     Sexual activity: Yes     Partners: Male   Other Topics Concern     Not on file   Social History Narrative     Not on file     Social Determinants of Health     Financial Resource Strain: Not on file   Food Insecurity: Not on file   Transportation Needs: Not on file   Physical Activity: Not on file   Stress: Not on file   Social Connections: Not on file   Intimate Partner Violence: Not on file   Housing Stability: Not on file     Physical Exam:  There were no vitals taken for this  visit.   Wt Readings from Last 4 Encounters:   09/14/21 69.2 kg (152 lb 8 oz)   11/21/13 70.3 kg (155 lb) (84 %, Z= 1.01)*     * Growth percentiles are based on CDC (Girls, 2-20 Years) data.       Constitutional.  Looks well and in no apparent distress.   Eyes.  Without eye redness or apparent jaundice.   Respiratory.  Non labored breathing. Speaking in full sentences.    Skin.  No concerning skin rashes on the skin visualized.   Neurological.  Is alert and oriented.  Psychiatric.  Mood and affect seem appropriate.      The rest of a comprehensive physical examination is deferred due to Public [facility] video visit restrictions.    Laboratory/Imaging Studies      Reviewed  11/23/2021.    CBC showed WBC 4.6.  Hemoglobin 10.6.  MCV 77.  Platelets 172.  Iron studies show ferritin 112.  Iron 60.  TIBC 263 and iron saturation index 23%.      10/12/2021.    CBC shows hemoglobin 8.  MCV 72.    CMP unremarkable.  Ferritin 2.  Iron 12.  TIBC 427 and iron saturation index 3%.  B12 and folate were normal.    9/24/2021  CBC shows WBC 6.1. Hemoglobin 9. MCV is 71. Platelets 238.  Ferritin 3, iron 19, TIBC 431, iron saturation index 4%.  Tissue transglutaminase antibodies negative.    Hep C and HIV - negative      ASSESSMENT/PLAN:    Iron deficiency anemia.  At least at this time heavy menstrual bleeding is contributing.  It seemed that previously she had issues with GI bleeding.  Right now she does not see any hematochezia or melena.   She received INFeD on 10/19/2021 for severe iron deficiency anemia.  Symptomatically she has improved.  Hemoglobin has also improved but she still remains mildly anemic.  Iron levels are in the normal range right now.   I would recommend that she restarts taking oral iron which she stopped after getting IV iron.  She was tolerating it well.   We should repeat labs in about a month or so because I suspect that she would need IV iron again in the coming months.     Treating the cause of iron  deficiency anemia.  I again strongly encouraged her to follow with GYN to discuss options how to control heavy menstrual bleeding because this is causing iron deficiency anemia.  If even after properly dealing with heavy menstrual bleeding, she continues to remain iron deficient then she should have a repeat thorough GI work-up.     I would plan to see her back in early January with repeat labs prior.     All questions were answered to her satisfaction.  She is agreeable and comfortable with the plan.    Al Trinidad MD

## 2021-11-30 NOTE — LETTER
11/30/2021         RE: Helena Pelaez  [address] [facility] [id number]        Dear Colleague,    Thank you for referring your patient, Helena Pelaez, to the [facility]. Please see a copy of my visit note below.    Helena is a 26 year old who is being evaluated via a billable video visit. Currently in State of [place].     How would you like to obtain your AVS? MyChart  If the video visit is dropped, the invitation should be resent by: Text to cell phone: [phone]  Will anyone else be joining your video visit? No    Video Start Time: 9:08 AM  Video-Visit Details    Type of service:  Video Visit    Video End Time:9:17 AM    Originating Location (pt. Location): Home    Distant Location (provider location):  [facility]     Platform used for Video Visit: [employer]    Hematology follow-up visit:  Date on this visit: 11/30/21     Helena Pelaez  is referred by Dr.Pamela SPENCER Brady for a hematology consultation. She requires evaluation for anemia.    Primary Physician: Carolina Cox     History Of Present Illness:  Ms. Pelaez is a 26 year old female who presents with anemia.    She initially saw me on 10/12/2021.  Please see my previous note for details.  I have copied and updated from prior note.    She has a history of iron deficiency anemia in the past.    She has been anemic since the age of 18 months. When she was in 8th grade, she had Hg of 3. No hx of blood transfusion or IV iron    During High School, she had EGD, colonoscopy and Pill Cam. These tests showed that she is bleeding from the gut but unable to pin point from where she is bleeding.    For the last several months, she has been feeling fatigue, dizziness/light headedness, mental fogginess. She has cravings for green olives/oreos. Has rare restless legs. Has not noticed hematochezia/melena. No nausea, vomiting, diarrhea or constipation. No hematuria. Has  heavy menstrual bleeding and bleeds once a months. She bleeds on average 5-6 days a months. 3 days are heavy. She uses super plus tampons and also uses pads overnight. Changes tampons every 3 hours and these are soaked with blood and she routinely sees clots. Has tried oral iron for several years and tolerates them well but see no improvement clinically.     Interval history.  This is a video visit.  She received INFeD on 10/19/2021.  She feels better and the lightheaded/dizzy spells have not happened again.  Energy is also better although feels a little fatigued.  Cravings for all of/overuse has also improved.  Mental fogginess is better although has not completely resolved.  She continues to have heavy menstrual bleeding although she thinks that the last menstrual period was slightly less heavy.  No other bleeding anywhere else.  She has not talked to GYN.  She stopped taking oral iron after she received IV iron.  She was tolerating oral iron once daily well.    ROS:  Rest of the review of the system was unremarkable.    I reviewed other history in epic as below.      Past Medical/Surgical History:  No past medical history on file.  No past surgical history on file.   Iron deficiency anemia.      Allergies:  Allergies as of 11/30/2021 - Reviewed 11/30/2021   Allergen Reaction Noted     Amoxicillin Hives 11/21/2013     Current Medications:  Current Outpatient Medications   Medication Sig Dispense Refill     IRON PO Take 1 tablet by mouth daily        Family History:  Family History   Problem Relation Age of Onset     Ovarian cysts Mother      Multiple Sclerosis Mother 55     Depression Mother      Anxiety Disorder Mother      Coronary Artery Disease Maternal Grandmother      Diabetes Type 2  Paternal Grandmother      Hypertension Paternal Grandmother      Ovarian Cancer Maternal Aunt 65     Mother has iron deficiency anemia- she does not know the cause.    Social History:  Social History     Socioeconomic History      Marital status:      Spouse name: Not on file     Number of children: Not on file     Years of education: Not on file     Highest education level: Not on file   Occupational History     Not on file   Tobacco Use     Smoking status: Never Smoker     Smokeless tobacco: Never Used   Substance and Sexual Activity     Alcohol use: Never     Drug use: Never     Sexual activity: Yes     Partners: Male   Other Topics Concern     Not on file   Social History Narrative     Not on file     Social Determinants of Health     Financial Resource Strain: Not on file   Food Insecurity: Not on file   Transportation Needs: Not on file   Physical Activity: Not on file   Stress: Not on file   Social Connections: Not on file   Intimate Partner Violence: Not on file   Housing Stability: Not on file     Physical Exam:  There were no vitals taken for this visit.   Wt Readings from Last 4 Encounters:   09/14/21 69.2 kg (152 lb 8 oz)   11/21/13 70.3 kg (155 lb) (84 %, Z= 1.01)*     * Growth percentiles are based on [facility] (Girls, 2-20 Years) data.       Constitutional.  Looks well and in no apparent distress.   Eyes.  Without eye redness or apparent jaundice.   Respiratory.  Non labored breathing. Speaking in full sentences.    Skin.  No concerning skin rashes on the skin visualized.   Neurological.  Is alert and oriented.  Psychiatric.  Mood and affect seem appropriate.      The rest of a comprehensive physical examination is deferred due to Public Health Emergency video visit restrictions.    Laboratory/Imaging Studies      Reviewed  11/23/2021.    CBC showed WBC 4.6.  Hemoglobin 10.6.  MCV 77.  Platelets 172.  Iron studies show ferritin 112.  Iron 60.  TIBC 263 and iron saturation index 23%.      10/12/2021.    CBC shows hemoglobin 8.  MCV 72.    CMP unremarkable.  Ferritin 2.  Iron 12.  TIBC 427 and iron saturation index 3%.  B12 and folate were normal.    9/24/2021  CBC shows WBC 6.1. Hemoglobin 9. MCV is 71. Platelets  238.  Ferritin 3, iron 19, TIBC 431, iron saturation index 4%.  Tissue transglutaminase antibodies negative.    Hep C and HIV - negative      ASSESSMENT/PLAN:    Iron deficiency anemia.  At least at this time heavy menstrual bleeding is contributing.  It seemed that previously she had issues with GI bleeding.  Right now she does not see any hematochezia or melena.   She received INFeD on 10/19/2021 for severe iron deficiency anemia.  Symptomatically she has improved.  Hemoglobin has also improved but she still remains mildly anemic.  Iron levels are in the normal range right now.   I would recommend that she restarts taking oral iron which she stopped after getting IV iron.  She was tolerating it well.   We should repeat labs in about a month or so because I suspect that she would need IV iron again in the coming months.     Treating the cause of iron deficiency anemia.  I again strongly encouraged her to follow with GYN to discuss options how to control heavy menstrual bleeding because this is causing iron deficiency anemia.  If even after properly dealing with heavy menstrual bleeding, she continues to remain iron deficient then she should have a repeat thorough GI work-up.     I would plan to see her back in early January with repeat labs prior.     All questions were answered to her satisfaction.  She is agreeable and comfortable with the plan.    Al Trinidad MD           Again, thank you for allowing me to participate in the care of your patient.        Sincerely,        Al Trinidad MD

## 2022-01-13 ENCOUNTER — TELEPHONE (OUTPATIENT)
Dept: ONCOLOGY | Facility: CLINIC | Age: 27
End: 2022-01-13
Payer: COMMERCIAL

## 2022-01-13 NOTE — TELEPHONE ENCOUNTER
attempted 2nd time to reach the patient and reschedule lab and visit with Dr. Trinidad.    Blaker rashi.    [facility]  [facility]Infusion [facility]   [phone]

## 2023-01-14 ENCOUNTER — HEALTH MAINTENANCE LETTER (OUTPATIENT)
Age: 28
End: 2023-01-14

## 2024-02-11 ENCOUNTER — HEALTH MAINTENANCE LETTER (OUTPATIENT)
Age: 29
End: 2024-02-11